# Patient Record
Sex: FEMALE | Race: WHITE | Employment: OTHER | URBAN - METROPOLITAN AREA
[De-identification: names, ages, dates, MRNs, and addresses within clinical notes are randomized per-mention and may not be internally consistent; named-entity substitution may affect disease eponyms.]

---

## 2022-04-06 ENCOUNTER — OFFICE VISIT (OUTPATIENT)
Dept: NEUROLOGY | Age: 67
End: 2022-04-06
Payer: COMMERCIAL

## 2022-04-06 VITALS
WEIGHT: 177 LBS | HEART RATE: 70 BPM | DIASTOLIC BLOOD PRESSURE: 70 MMHG | OXYGEN SATURATION: 97 % | SYSTOLIC BLOOD PRESSURE: 122 MMHG | BODY MASS INDEX: 34.75 KG/M2 | HEIGHT: 60 IN

## 2022-04-06 DIAGNOSIS — G43.109 MIGRAINE WITH AURA AND WITHOUT STATUS MIGRAINOSUS, NOT INTRACTABLE: Primary | ICD-10-CM

## 2022-04-06 PROCEDURE — 99215 OFFICE O/P EST HI 40 MIN: CPT | Performed by: STUDENT IN AN ORGANIZED HEALTH CARE EDUCATION/TRAINING PROGRAM

## 2022-04-06 RX ORDER — ERENUMAB-AOOE 140 MG/ML
140 INJECTION, SOLUTION SUBCUTANEOUS
Qty: 1 PEN | Refills: 11 | Status: SHIPPED | OUTPATIENT
Start: 2022-04-06 | End: 2022-04-06 | Stop reason: SDUPTHER

## 2022-04-06 RX ORDER — CARBAMAZEPINE 200 MG/1
200 TABLET ORAL 2 TIMES DAILY
COMMUNITY
Start: 2022-02-02

## 2022-04-06 RX ORDER — LISINOPRIL 20 MG/1
20 TABLET ORAL 2 TIMES DAILY
COMMUNITY
Start: 2022-01-13

## 2022-04-06 RX ORDER — HYDROCHLOROTHIAZIDE 12.5 MG/1
12.5 TABLET ORAL DAILY
COMMUNITY

## 2022-04-06 RX ORDER — FAMOTIDINE 20 MG/1
20 TABLET, FILM COATED ORAL 2 TIMES DAILY
COMMUNITY

## 2022-04-06 RX ORDER — ERENUMAB-AOOE 140 MG/ML
140 INJECTION, SOLUTION SUBCUTANEOUS
COMMUNITY
End: 2022-04-06 | Stop reason: SDUPTHER

## 2022-04-06 RX ORDER — PANTOPRAZOLE SODIUM 40 MG/1
40 TABLET, DELAYED RELEASE ORAL DAILY
COMMUNITY

## 2022-04-06 RX ORDER — ZOLMITRIPTAN 2.5 MG/1
2.5 SPRAY, METERED NASAL PRN
COMMUNITY

## 2022-04-06 RX ORDER — ESTRADIOL 2 MG/1
2 TABLET ORAL DAILY
COMMUNITY

## 2022-04-06 RX ORDER — ERENUMAB-AOOE 140 MG/ML
140 INJECTION, SOLUTION SUBCUTANEOUS
Qty: 1 PEN | Refills: 0 | COMMUNITY
Start: 2022-04-06 | End: 2022-10-12 | Stop reason: SDUPTHER

## 2022-04-06 RX ORDER — MELOXICAM 15 MG/1
15 TABLET ORAL DAILY
COMMUNITY

## 2022-04-06 RX ORDER — ROSUVASTATIN CALCIUM 10 MG/1
10 TABLET, COATED ORAL DAILY
COMMUNITY

## 2022-04-06 RX ORDER — SUCRALFATE 1 G/1
1 TABLET ORAL 4 TIMES DAILY
COMMUNITY

## 2022-04-06 RX ORDER — GABAPENTIN 100 MG/1
100 CAPSULE ORAL 2 TIMES DAILY
COMMUNITY
Start: 2022-03-02

## 2022-04-06 RX ORDER — AMLODIPINE BESYLATE 5 MG/1
5 TABLET ORAL DAILY
COMMUNITY

## 2022-04-06 NOTE — PROGRESS NOTES
4/6/22    Joetere Blakely  1955    Chief Complaint   Patient presents with    New Patient     Previous DCND pt. Migraines       Neurologic Consult Note    Subjective    History of Present Illness  Kanika Ngo is a 77 y.o. female presenting today for neurologic evaluation of migraine. She is a former patient of Dr. James Byrne at our [facility] office and is transitioning her neurologic care to our [facility] location. She was last seen in March 2021. At that time, she reported that her migraines were under excellent control with use of Aimovig as migraine preventive and Zomig for migraine abortive. Regarding her headaches, she describes right frontal headache that is throbbing in nature. She is photophobic and phonophobic. Sometimes the headaches can be sharp. She does describe aura with these. She describes wanting to be still when she has a bad headache. Since being on Aimovig she reports 1-2 migraines per month. Prior to the [address] she was 12-16 migraine days per month. She continues to take zomig with good success. She denies side effects to aimovig or zomig. Does endorse needing refills of Aimovig today, however, states that she does not need refills of Zomig. Prior preventative: topamax, elavil, gabapentin, propranolol  Prior abortive: imitrex    She tells me today that she will be having a tummy tuck in May. Review of Symptoms:  Neurologic   Symptoms: no difficulty with gait or walking, no bowel symptoms, no vertigo, no confusion, no memory loss, no speech disorder, no visual loss, no double vision, no dizziness, no loss of hearing, no sensory disturbances, no weakness, + headaches, no bladder symptoms, no seizures, no excessive fatigue and no syncope    Current Outpatient Medications   Medication Sig Dispense Refill    Homeopathic Products (ARNICA EX) Arnica Active 1 EA TOP . PRN February 21st, 2022 11:02am      Erenumab-aooe (AIMOVIG) 140 MG/ML SOAJ Inject 140 mg into the skin every 30 days 1 pen 11    lisinopril (PRINIVIL;ZESTRIL) 20 MG tablet Take 20 mg by mouth in the morning and at bedtime      gabapentin (NEURONTIN) 100 MG capsule Take 100 mg by mouth in the morning and at bedtime.  meloxicam (MOBIC) 15 MG tablet Take 15 mg by mouth daily      Erenumab-aooe (AIMOVIG) 140 MG/ML SOAJ Inject 140 mg into the skin every 30 days      estradiol (ESTRACE) 2 MG tablet Take 2 mg by mouth daily      carBAMazepine (TEGRETOL) 200 MG tablet Take 200 mg by mouth in the morning and at bedtime       No current facility-administered medications for this visit. No past medical history on file. No past surgical history on file. Social History     Socioeconomic History    Marital status:      Spouse name: Not on file    Number of children: Not on file    Years of education: Not on file    Highest education level: Not on file   Occupational History    Not on file   Tobacco Use    Smoking status: Not on file    Smokeless tobacco: Not on file   Substance and Sexual Activity    Alcohol use: Not on file    Drug use: Not on file    Sexual activity: Not on file   Other Topics Concern    Not on file   Social History Narrative    Not on file     Social Determinants of Health     Financial Resource Strain:     Difficulty of Paying Living Expenses: Not on file   Food Insecurity:     Worried About Running Out of Food in the Last Year: Not on file    Lor of Food in the Last Year: Not on file   Transportation Needs:     Lack of Transportation (Medical): Not on file    Lack of Transportation (Non-Medical):  Not on file   Physical Activity:     Days of Exercise per Week: Not on file    Minutes of Exercise per Session: Not on file   Stress:     Feeling of Stress : Not on file   Social Connections:     Frequency of Communication with Friends and Family: Not on file    Frequency of Social Gatherings with Friends and Family: Not on file    Attends Mu-ism Services: Not on file    Active Member of Clubs or Organizations: Not on file    Attends Club or Organization Meetings: Not on file    Marital Status: Not on file   Intimate Partner Violence:     Fear of Current or Ex-Partner: Not on file    Emotionally Abused: Not on file    Physically Abused: Not on file    Sexually Abused: Not on file   Housing Stability:     Unable to Pay for Housing in the Last Year: Not on file    Number of [place] in the Last Year: Not on file    Unstable Housing in the Last Year: Not on file       No family history on file.     Objective    Physical Exam:  Also present during visit: None    Constitutional   Weight: well nourished  Heart/Vascular   No cyanosis or clubbing appreciated  Neck   Appearance/Palpation/Auscultation: supple  Mental Status   Orientation: oriented to person, oriented to place, oriented to problem and oriented to time   Mood/Affect: appropriate mood and appropriate affect   Memory/Other: recent memory intact, remote memory intact, fund of knowledge intact, attention span normal and concentration normal  Language   Language: (normal) language, no dysarthria, (normal) articulation and no dysphasia/aphasia  Cranial Nerves   CN II Right: visual fields appear intact   CN II Left: visual fields appear intact   CN III, IV, VI: EOM no nystagmus, normal pursuit and extraocular muscle strength normal   CN III: pupil normal size, pupil reactive to light and dark, pupil accomodates and no ptosis   CN IV: normal   CN VI: normal   CN V Right: normal sensation and muscles of mastication intact   CN V Left: normal sensation and muscles of mastication intact   CN VII Right: normal facial expression   CN VII Left: normal facial expression   CN VIII Right: hearing in tact to normal conversation   CN VIII Left: hearing in tact to normal conversation   CN IX,X: normal palatal movement   CN XI Right: normal sternocleidomastoid and normal trapezius   CN XI Left: normal sternocleidomastoid and normal trapezius   CN XII: no tremors of the tongue, no fasciculation of the tongue, tongue protrudes midline, normal power to left and normal power to right  Gait and Stance   Gait/Posture: station normal, ambulates independently, and gait normal  Motor/Coordination Exam   General: no bradykinesia, no tremors, no chorea, no athetosis, no myoclonus and no dyskinesia   Right Upper Extremity: normal motor strength, normal bulk and normal tone   Left Upper Extremity: normal motor strength, normal bulk and normal tone   Right Lower Extremity: normal motor strength, normal bulk and normal tone   Left Lower Extremity: normal motor strength, normal bulk and normal tone   Coordination: no drift, normal finger-to-nose, normal heel-to-shin and rapid alternating movements normal  Reflexes   Reflexes Right: 2/4 biceps, brachioradialis, patellar    Reflexes Left: 2/4 biceps, brachioradialis, patellar     Sensory   Sensation: normal light touch,  normal temperature, normal vibration, normal DSS, and no neglect    Lungs   No auditory wheezing  Skin   Inspection: no jaundice, no lesions, no rashes and no cyanosis      /70 (Site: Left Upper Arm, Position: Sitting, Cuff Size: Large Adult)   Pulse 70   Ht 5' (1.524 m)   Wt 177 lb (80.3 kg)   SpO2 97%   BMI 34.57 kg/m²     Assessment and Plan     Diagnosis Orders   1. Migraine with aura and without status migrainosus, not intractable  Erenumab-aooe (AIMOVIG) 140 MG/ML Adi Sahu has longstanding history of migraine with aura. She was seen today as a transition of care from our [facility] office. We spent time reviewing her migraine history. I am pleased to hear that she has continued to do quite well on Aimovig as migraine preventative and Zomig as migraine abortive. I will have her continue on both of these medications at this time. She was provided with a refill of Aimovig as well as provided with a sample today in the office.     I spent >40 minutes total time regarding this patient's encounter. This included obtaining history, performing a neurological medical exam, developing an assessment / plan, and documenting via EMR. Return in about 6 months (around 10/6/2022) for follow up with MATHEW.     Lu Govea DO

## 2022-10-12 ENCOUNTER — OFFICE VISIT (OUTPATIENT)
Dept: NEUROLOGY | Age: 67
End: 2022-10-12
Payer: COMMERCIAL

## 2022-10-12 VITALS
BODY MASS INDEX: 34.75 KG/M2 | HEIGHT: 60 IN | SYSTOLIC BLOOD PRESSURE: 130 MMHG | HEART RATE: 91 BPM | WEIGHT: 177 LBS | OXYGEN SATURATION: 96 % | DIASTOLIC BLOOD PRESSURE: 80 MMHG

## 2022-10-12 DIAGNOSIS — G43.109 MIGRAINE WITH AURA AND WITHOUT STATUS MIGRAINOSUS, NOT INTRACTABLE: ICD-10-CM

## 2022-10-12 PROCEDURE — 1123F ACP DISCUSS/DSCN MKR DOCD: CPT | Performed by: NURSE PRACTITIONER

## 2022-10-12 PROCEDURE — 99213 OFFICE O/P EST LOW 20 MIN: CPT | Performed by: NURSE PRACTITIONER

## 2022-10-12 RX ORDER — ERENUMAB-AOOE 140 MG/ML
140 INJECTION, SOLUTION SUBCUTANEOUS
Qty: 1 ADJUSTABLE DOSE PRE-FILLED PEN SYRINGE | Refills: 11 | Status: SHIPPED | OUTPATIENT
Start: 2022-10-12

## 2022-10-12 RX ORDER — FUROSEMIDE 20 MG/1
20 TABLET ORAL DAILY
COMMUNITY

## 2022-10-12 NOTE — PATIENT INSTRUCTIONS
Please contact our office around 3/11/2023 to get your follow up appointment made. Our scheduling phone number is [phone]. Thank you!

## 2022-10-12 NOTE — PROGRESS NOTES
10/12/22    Wilian Lacey  1955    Chief Complaint   Patient presents with    Migraine     Pt presents for migraine f/u, pt states she is doing a lot better and marks a lot of improvement, pt states meds are helping a lot        History of Present Illness  Cirilo Voss is a 79 y.o. female presenting today for follow-up of: Migraine with aura, she describes her migraines as right frontal throbbing in nature associated with photo/phonophobia with aura. She takes Aimovig for migraine prevention and Zomig for migraine abortive. She was only reporting 1-2 headaches per month at her last visit, today she reports 3-4. Her triggers are weather changing and this is why she has had a slight increase. She is happy with her management of her migraines. Prior preventative: topamax, elavil, gabapentin, propranolol  Prior abortive: imitrex    Current Outpatient Medications   Medication Sig Dispense Refill    Erenumab-aooe (AIMOVIG) 140 MG/ML SOAJ Inject 140 mg into the skin every 30 days 1 Adjustable Dose Pre-filled Pen Syringe 11    lisinopril (PRINIVIL;ZESTRIL) 20 MG tablet Take 20 mg by mouth in the morning and at bedtime      gabapentin (NEURONTIN) 100 MG capsule Take 100 mg by mouth in the morning and at bedtime. Homeopathic Products (ARNICA EX) Arnica Active 1 EA TOP . PRN February 21st, 2022 11:02am      meloxicam (MOBIC) 15 MG tablet Take 15 mg by mouth daily      estradiol (ESTRACE) 2 MG tablet Take 2 mg by mouth daily      carBAMazepine (TEGRETOL) 200 MG tablet Take 200 mg by mouth in the morning and at bedtime      hydroCHLOROthiazide (HYDRODIURIL) 12.5 MG tablet Take 12.5 mg by mouth daily      amLODIPine (NORVASC) 5 MG tablet Take 5 mg by mouth daily      rosuvastatin (CRESTOR) 10 MG tablet Take 10 mg by mouth daily      pantoprazole (PROTONIX) 40 MG tablet Take 40 mg by mouth daily      ZOLMitriptan 2.5 MG SOLN 2.5 mg by Nasal route as needed      sucralfate (CARAFATE) 1 GM tablet Take 1 g by mouth 4 times daily      famotidine (PEPCID) 20 MG tablet Take 20 mg by mouth 2 times daily      furosemide (LASIX) 20 MG tablet Take 20 mg by mouth daily       No current facility-administered medications for this visit. Physical Exam:  Also present during visit:  Alone . Mental Status   Orientation: oriented to person, oriented to place, oriented to problem, and oriented to time    Mood/affectappropriate mood and appropriate affect   Memory/Other: recent memory intact, remote memory intact, fund of knowledge intact, attention span normal, and concentration normal  Language  Language: (normal) language, no dysarthria, (normal) articulation, and no dysphasia/aphasia  Cranial Nerves   Eyes: pupils normal size and reactive to light and visual fields appear full   CN III, IV, VI : extraocular muscle strength normal, normal pursuit, no nystagmus, and no ptosis   Facial Motor: normal facial motor   CN XII: tongue protrudes midline  Motor/Coordination Exam   Power: motor strength appears intact throughout, no arm drift, and normal tone   Coordination: normal finger-to-nose, forearm rotation intact, and rapid alternating movement normal  Gait and Stance   Gait/Posture: station normal, casual gait normal, ambulates independently , tiptoe normal, and steady in Romberg's position with eyes open and closed        /80 (Site: Left Upper Arm, Position: Sitting, Cuff Size: Medium Adult)   Pulse 91   Ht 5' (1.524 m)   Wt 177 lb (80.3 kg)   SpO2 96%   BMI 34.57 kg/m²     Assessment and Plan     Diagnosis Orders   1. Migraine with aura and without status migrainosus, not intractable  Erenumab-aooe (AIMOVIG) 140 MG/ML Last Doyle was seen in neurological follow up in regards to migraine. Randall Hauser tells me she is happy with the management of her migraines. No changes were made today. Return in about 6 months (around 4/12/2023).     Claudetta Chafe, APRN - CNP

## 2023-11-20 ENCOUNTER — OFFICE VISIT (OUTPATIENT)
Dept: NEUROLOGY | Age: 68
End: 2023-11-20
Payer: COMMERCIAL

## 2023-11-20 VITALS
SYSTOLIC BLOOD PRESSURE: 138 MMHG | OXYGEN SATURATION: 98 % | WEIGHT: 181.4 LBS | DIASTOLIC BLOOD PRESSURE: 86 MMHG | HEIGHT: 60 IN | HEART RATE: 82 BPM | BODY MASS INDEX: 35.61 KG/M2

## 2023-11-20 DIAGNOSIS — M62.838 NECK MUSCLE SPASM: ICD-10-CM

## 2023-11-20 DIAGNOSIS — G43.109 MIGRAINE WITH AURA AND WITHOUT STATUS MIGRAINOSUS, NOT INTRACTABLE: Primary | ICD-10-CM

## 2023-11-20 PROCEDURE — 1123F ACP DISCUSS/DSCN MKR DOCD: CPT | Performed by: NURSE PRACTITIONER

## 2023-11-20 PROCEDURE — 99214 OFFICE O/P EST MOD 30 MIN: CPT | Performed by: NURSE PRACTITIONER

## 2023-11-20 RX ORDER — ERENUMAB-AOOE 140 MG/ML
140 INJECTION, SOLUTION SUBCUTANEOUS
Qty: 1 ADJUSTABLE DOSE PRE-FILLED PEN SYRINGE | Refills: 11 | Status: SHIPPED | OUTPATIENT
Start: 2023-11-20

## 2023-11-20 RX ORDER — CYCLOBENZAPRINE HCL 10 MG
10 TABLET ORAL 3 TIMES DAILY PRN
COMMUNITY
Start: 2023-03-15

## 2023-11-20 NOTE — PROGRESS NOTES
11/20/23    Mouna Davidson  1955    Chief Complaint   Patient presents with    Follow-up     Pt presents for follow-up for migraines. History of Present Illness  Stephanie Santacruz is a 76 y.o. female presenting today for follow-up of: Migraine with aura   She takes Aimovig for migraine prevention and Zomig for migraine abortive. She has about 3-4 migraines per month. She is happy with her management of her migraines. She would like a refill on her Aimovig and Zomig. She was at [facility] on 9/22/23 for new complaints of left sided neck spasm. She woke up screaming. When she turns her head, her neck it will start to spasm. she was given Robaxin. Pain got better up until few days ago and she had another neck spasm. She gets a massage every week at [facility] in [place]. The pain does not radiate down her arm. She has no weakness in her arm. Prior preventative: topamax, elavil, gabapentin, propranolol  Prior abortive: imitrex    Current Outpatient Medications   Medication Sig Dispense Refill    cyclobenzaprine (FLEXERIL) 10 MG tablet Take 1 tablet by mouth 3 times daily as needed      Erenumab-aooe (AIMOVIG) 140 MG/ML SOAJ Inject 140 mg into the skin every 30 days 1 Adjustable Dose Pre-filled Pen Syringe 11    lisinopril (PRINIVIL;ZESTRIL) 20 MG tablet Take 1 tablet by mouth in the morning and at bedtime      gabapentin (NEURONTIN) 100 MG capsule Take 1 capsule by mouth 3 times daily. Homeopathic Products (ARNICA EX) Arnica Active 1 EA TOP . PRN February 21st, 2022 11:02am      estradiol (ESTRACE) 2 MG tablet Take 1 tablet by mouth daily      carBAMazepine (TEGRETOL) 200 MG tablet Take 1 tablet by mouth 3 times daily      hydroCHLOROthiazide (HYDRODIURIL) 12.5 MG tablet Take 1 tablet by mouth daily      amLODIPine (NORVASC) 5 MG tablet Take 1 tablet by mouth daily      rosuvastatin (CRESTOR) 10 MG tablet Take 1 tablet by mouth daily      pantoprazole (PROTONIX) 40 MG tablet Take 1 tablet by

## 2024-05-15 ENCOUNTER — TELEPHONE (OUTPATIENT)
Dept: NEUROLOGY | Age: 69
End: 2024-05-15

## 2024-05-24 ENCOUNTER — OFFICE VISIT (OUTPATIENT)
Dept: NEUROLOGY | Age: 69
End: 2024-05-24
Payer: COMMERCIAL

## 2024-05-24 ENCOUNTER — TELEPHONE (OUTPATIENT)
Dept: NEUROLOGY | Age: 69
End: 2024-05-24

## 2024-05-24 VITALS
HEART RATE: 86 BPM | WEIGHT: 183.6 LBS | SYSTOLIC BLOOD PRESSURE: 108 MMHG | OXYGEN SATURATION: 97 % | DIASTOLIC BLOOD PRESSURE: 60 MMHG | BODY MASS INDEX: 35.86 KG/M2

## 2024-05-24 DIAGNOSIS — G43.109 MIGRAINE WITH AURA AND WITHOUT STATUS MIGRAINOSUS, NOT INTRACTABLE: Primary | ICD-10-CM

## 2024-05-24 PROCEDURE — 1123F ACP DISCUSS/DSCN MKR DOCD: CPT | Performed by: NURSE PRACTITIONER

## 2024-05-24 PROCEDURE — 99213 OFFICE O/P EST LOW 20 MIN: CPT | Performed by: NURSE PRACTITIONER

## 2024-05-24 RX ORDER — ZOLMITRIPTAN 2.5 MG/1
2.5 SPRAY, METERED NASAL PRN
Qty: 30 EACH | Refills: 2 | Status: SHIPPED | OUTPATIENT
Start: 2024-05-24

## 2024-05-24 NOTE — PROGRESS NOTES
5/24/24    Sara Monreal  1955    Chief Complaint   Patient presents with    Follow-up     6M follow up for Migraine with aura and without status migrainosus, not intractable.      History of Present Illness  Sara is a 69 y.o. female presenting today for follow-up of: Migraine with aura.  She takes  Aimovig for preventative therapy, Zomig for abortive therapy.    She tells me her migraines are well-controlled.  She tells me her migraines are not her main concern, she is having issues with her right ankle, recently had an MRI and following with ortho. otherwise she feels great.    Prior preventative: topamax, elavil, gabapentin, propranolol  Prior abortive: imitrex    Current Outpatient Medications   Medication Sig Dispense Refill    ZOLMitriptan 2.5 MG SOLN 2.5 mg by Nasal route as needed (take 1 dose as needed for migraine, may repeat dose in 2 hrs if needed) 30 each 2    cyclobenzaprine (FLEXERIL) 10 MG tablet Take 1 tablet by mouth 3 times daily as needed      Erenumab-aooe (AIMOVIG) 140 MG/ML SOAJ Inject 140 mg into the skin every 30 days 1 Adjustable Dose Pre-filled Pen Syringe 11    furosemide (LASIX) 20 MG tablet Take 1 tablet by mouth daily      lisinopril (PRINIVIL;ZESTRIL) 20 MG tablet Take 1 tablet by mouth in the morning and at bedtime      gabapentin (NEURONTIN) 100 MG capsule Take 1 capsule by mouth 3 times daily.      Homeopathic Products (ARNICA EX) Arnica Active 1 EA TOP .PRN February 21st, 2022 11:02am      estradiol (ESTRACE) 2 MG tablet Take 1 tablet by mouth daily      carBAMazepine (TEGRETOL) 200 MG tablet Take 1 tablet by mouth 3 times daily      amLODIPine (NORVASC) 5 MG tablet Take 1 tablet by mouth daily      rosuvastatin (CRESTOR) 10 MG tablet Take 1 tablet by mouth daily      pantoprazole (PROTONIX) 40 MG tablet Take 1 tablet by mouth daily      famotidine (PEPCID) 20 MG tablet Take 1 tablet by mouth 2 times daily      meloxicam (MOBIC) 15 MG tablet Take 15 mg by mouth daily

## 2024-06-04 ENCOUNTER — TELEPHONE (OUTPATIENT)
Dept: NEUROLOGY | Age: 69
End: 2024-06-04

## 2024-06-12 ENCOUNTER — TELEPHONE (OUTPATIENT)
Dept: NEUROLOGY | Age: 69
End: 2024-06-12

## 2024-06-12 NOTE — TELEPHONE ENCOUNTER
Called [facility] and spoke w/Kierra TREVIZO rep.  She stated denial upheld due to plan exclusion.  Last date PA approved was 2/5/2024.

## 2024-12-11 DIAGNOSIS — G43.109 MIGRAINE WITH AURA AND WITHOUT STATUS MIGRAINOSUS, NOT INTRACTABLE: ICD-10-CM

## 2024-12-12 RX ORDER — ERENUMAB-AOOE 140 MG/ML
INJECTION, SOLUTION SUBCUTANEOUS
Qty: 1 ML | Refills: 11 | Status: SHIPPED | OUTPATIENT
Start: 2024-12-12

## 2024-12-12 NOTE — TELEPHONE ENCOUNTER
Last Visit: 5/24/24    No follow up on file.     Rx is pending.     Requested Prescriptions     Pending Prescriptions Disp Refills    AIMOVIG 140 MG/ML SOAJ [Pharmacy Med Name: AIMOVIG 140 MG/ML AUTOINJECTOR] 1 mL      Sig: INJECT ONE SYRINGE UNDER THE SKIN ONCE EVERY MONTH

## 2025-06-09 ENCOUNTER — TELEPHONE (OUTPATIENT)
Age: 70
End: 2025-06-09

## 2025-06-09 DIAGNOSIS — G43.109 MIGRAINE WITH AURA AND WITHOUT STATUS MIGRAINOSUS, NOT INTRACTABLE: ICD-10-CM

## 2025-06-10 RX ORDER — ERENUMAB-AOOE 140 MG/ML
140 INJECTION, SOLUTION SUBCUTANEOUS
Qty: 1 ML | Refills: 11 | Status: ACTIVE | OUTPATIENT
Start: 2025-06-10

## 2025-06-13 RX ORDER — ERENUMAB-AOOE 140 MG/ML
140 INJECTION, SOLUTION SUBCUTANEOUS
Qty: 1 ML | Refills: 11 | OUTPATIENT
Start: 2025-06-13

## 2025-06-13 NOTE — TELEPHONE ENCOUNTER
Per patient she received the medication from [facility]. Received 3 syringes and would like for August bernabe to be at her local pharmacy

## 2025-06-13 NOTE — TELEPHONE ENCOUNTER
Patient called to advise her Aimovig was sent to [facility] but states she had [facility]. States she had a bad experience with them before and would rather pick it up. PA was approved for 2 years per chart and patient was aware. She called [facility] and told them she was not going to use them already.

## 2025-06-16 DIAGNOSIS — G43.109 MIGRAINE WITH AURA AND WITHOUT STATUS MIGRAINOSUS, NOT INTRACTABLE: ICD-10-CM

## 2025-06-16 RX ORDER — ERENUMAB-AOOE 140 MG/ML
140 INJECTION, SOLUTION SUBCUTANEOUS
Qty: 1 ML | Refills: 11 | Status: ACTIVE | OUTPATIENT
Start: 2025-06-16